# Patient Record
Sex: MALE | Race: WHITE | NOT HISPANIC OR LATINO | Employment: OTHER | URBAN - METROPOLITAN AREA
[De-identification: names, ages, dates, MRNs, and addresses within clinical notes are randomized per-mention and may not be internally consistent; named-entity substitution may affect disease eponyms.]

---

## 2019-09-03 ENCOUNTER — APPOINTMENT (OUTPATIENT)
Dept: LAB | Facility: HOSPITAL | Age: 64
End: 2019-09-03

## 2019-09-03 ENCOUNTER — CLINICAL SUPPORT (OUTPATIENT)
Dept: GENETICS | Facility: HOSPITAL | Age: 64
End: 2019-09-03

## 2019-09-03 DIAGNOSIS — Z13.79 GENETIC TESTING: Primary | ICD-10-CM

## 2019-09-03 DIAGNOSIS — Z13.79 ENCOUNTER FOR GENETIC SCREENING: ICD-10-CM

## 2019-09-03 DIAGNOSIS — Z82.79 FAMILY HISTORY OF MARFAN SYNDROME: Primary | ICD-10-CM

## 2019-09-03 NOTE — PROGRESS NOTES
Daniel Anguiano, a 64-year old male, was referred for genetic counseling due to a family history of Marfan syndrome in his son. His son, Anil, recently tested positive for a pathogenic mutation in the FBN1 gene. His son underwent genetic testing after he had an ascending aortic dissection at age 33.  Mr. Anguiano was interested in discussing genetic testing for the known familial mutation and opted to pursue single site testing of the FBN1 gene.  Single site testing was ordered and results are expected in approximately 2-3 weeks.       FAMILY HISTORY:   Son: Marfan syndrome, FBN1+ (c.[id number])    A copy of Mr. Anguiano’s son’s genetic test results confirming the FBN1 mutation was available for review.     GENETIC COUNSELING: Marfan syndrome is a systemic disorder of connective tissue with a high degree of clinical variability. Cardinal manifestations involve the ocular, skeletal, and cardiovascular systems. FBN1 pathogenic mutations are associated with a broad phenotypic continuum, ranging from isolated features of Marfan syndrome to  presentation of severe and rapidly progressive disease in multiple organ systems. It is inherited in an autosomal dominant manner; therefore, children of an individual who has Marfan syndrome have a 50% risk of inheriting the FBN1 mutation and also having Marfan syndrome. Approximately 25% of individuals have Marfan syndrome as a result of a de jarad pathogenic FBN1 mutation, meaning that it is a new mutation that was not inherited from either parent. We discussed the clinical recommendations of someone who tests positive for a pathogenic mutation in the FBN1 gene.     The risks, benefits, and limitations of genetic testing and implications for clinical management following testing were reviewed. DNA test results can influence decisions regarding screening, prevention, and surgical management. Genetic testing can have significantly psychological implications for both individuals  and families. Also discussed was the possibility of employment and insurance discrimination based on genetic test results and the laws in place to prevent this ([place]), as well as the limitations of these laws. The implications of a positive or negative test result were discussed.       PLAN:  Single site testing for the known FBN1 familial mutation was ordered through [employer]. Results are expected in 2-3 weeks and I will call Mr. Anguiano to discuss results once they are received.       Melina Addison MS, [facility] – [place], [facility]     Licensed Certified Genetic Counselor

## 2019-09-16 ENCOUNTER — DOCUMENTATION (OUTPATIENT)
Dept: GENETICS | Facility: HOSPITAL | Age: 64
End: 2019-09-16

## 2019-09-16 NOTE — PROGRESS NOTES
Daniel Anguiano, a 64-year old male, was referred for genetic counseling due to a family history of Marfan syndrome in his son. His son, Anil, recently tested positive for a pathogenic mutation in the FBN1 gene. His son underwent genetic testing after he had an ascending aortic dissection at age 33.   Mr. Anguiano was interested in discussing genetic testing for the known familial mutation and opted to pursue single site testing of the FBN1 gene.  Genetic testing was negative for the pathogenic mutation in the FBN1 gene that was previously identified in his son, indicating that this genetic risk factor was not inherited from Mr. Anguiano. These normal results were discussed with Mr. Anguiano’s wife, per his request, on 19.     FAMILY HISTORY:   Son: Marfan syndrome, FBN1+ ([id number].[id number])    Of note, we briefly discussed Mr. Anguiano’s family history of cancer, which includes his grandfather’s history of pancreatic cancer. Given this family history, he would meet NCCN criteria for BRCA1/2 testing. We discussed that he could be re-referred to genetic counseling in the future to discuss genetic testing options and family history-based risk assessment if he is interested.      A copy of Mr. Anguiano’s son’s genetic test results confirming the FBN1 mutation was available for review.     GENETIC COUNSELING: Marfan syndrome is a systemic disorder of connective tissue with a high degree of clinical variability. Cardinal manifestations involve the ocular, skeletal, and cardiovascular systems. FBN1 pathogenic mutations are associated with a broad phenotypic continuum, ranging from isolated features of Marfan syndrome to  presentation of severe and rapidly progressive disease in multiple organ systems. It is inherited in an autosomal dominant manner; therefore, children of an individual who has Marfan syndrome have a 50% risk of inheriting the FBN1 mutation and also having Marfan syndrome. Approximately 25% of  individuals have Marfan syndrome as a result of a de jarad pathogenic FBN1 mutation, meaning that it is a new mutation that was not inherited from either parent. We discussed the clinical recommendations of someone who tests positive for a pathogenic mutation in the FBN1 gene.     The risks, benefits, and limitations of genetic testing and implications for clinical management following testing were reviewed. DNA test results can influence decisions regarding screening, prevention, and surgical management. Genetic testing can have significantly psychological implications for both individuals and families. Also discussed was the possibility of employment and insurance discrimination based on genetic test results and the laws in place to prevent this ([place]), as well as the limitations of these laws. The implications of a positive or negative test result were discussed.       TEST RESULTS: Genetic testing was negative for the familial pathogenic FBN1 mutation ([id number].[id number]). This indicates that this pathogenic mutation identified in his son was not inherited from Mr. Anguiano’s side of the family.     PLAN:  Genetic counseling remains available to Mr. Anguiano and his family. He is encouraged to contact us with any questions or concerns at [phone].       Melina Addison MS, [facility] [place] [facility]     Licensed Certified Genetic Counselor      Cc: Daniel Shankar MD

## 2020-03-03 ENCOUNTER — TELEPHONE (OUTPATIENT)
Dept: GASTROENTEROLOGY | Facility: CLINIC | Age: 65
End: 2020-03-03

## 2020-03-03 NOTE — TELEPHONE ENCOUNTER
I CALLED PATIENT BACK. CONFIRM APPOINTMENT FOR 3/30/20 WITH DR GALAVIZ. ALSO I PUT PHARMACY INFORMATION IN CHART.

## 2020-03-13 ENCOUNTER — TELEPHONE (OUTPATIENT)
Dept: GASTROENTEROLOGY | Facility: CLINIC | Age: 65
End: 2020-03-13

## 2020-06-03 DIAGNOSIS — Z12.11 SCREENING FOR COLON CANCER: Primary | ICD-10-CM

## 2020-06-03 RX ORDER — SODIUM, POTASSIUM,MAG SULFATES 17.5-3.13G
1 SOLUTION, RECONSTITUTED, ORAL ORAL TAKE AS DIRECTED
Qty: 2 BOTTLE | Refills: 0 | Status: SHIPPED | OUTPATIENT
Start: 2020-06-03

## 2020-06-05 ENCOUNTER — APPOINTMENT (OUTPATIENT)
Dept: PREADMISSION TESTING | Facility: HOSPITAL | Age: 65
End: 2020-06-05

## 2020-06-05 LAB
REF LAB TEST METHOD: NORMAL
SARS-COV-2 RNA RESP QL NAA+PROBE: NOT DETECTED

## 2020-06-05 PROCEDURE — C9803 HOPD COVID-19 SPEC COLLECT: HCPCS

## 2020-06-05 PROCEDURE — U0004 COV-19 TEST NON-CDC HGH THRU: HCPCS

## 2020-06-05 PROCEDURE — U0002 COVID-19 LAB TEST NON-CDC: HCPCS

## 2020-06-08 ENCOUNTER — OUTSIDE FACILITY SERVICE (OUTPATIENT)
Dept: GASTROENTEROLOGY | Facility: CLINIC | Age: 65
End: 2020-06-08

## 2020-06-08 PROCEDURE — 45385 COLONOSCOPY W/LESION REMOVAL: CPT | Performed by: INTERNAL MEDICINE

## 2020-06-08 PROCEDURE — 88305 TISSUE EXAM BY PATHOLOGIST: CPT | Performed by: INTERNAL MEDICINE

## 2020-06-08 PROCEDURE — 45380 COLONOSCOPY AND BIOPSY: CPT | Performed by: INTERNAL MEDICINE

## 2020-06-09 ENCOUNTER — LAB REQUISITION (OUTPATIENT)
Dept: LAB | Facility: HOSPITAL | Age: 65
End: 2020-06-09

## 2020-06-09 DIAGNOSIS — Z86.010 PERSONAL HISTORY OF COLONIC POLYPS: ICD-10-CM

## 2020-06-10 LAB
CYTO UR: NORMAL
LAB AP CASE REPORT: NORMAL
LAB AP CLINICAL INFORMATION: NORMAL
PATH REPORT.FINAL DX SPEC: NORMAL
PATH REPORT.GROSS SPEC: NORMAL

## 2023-06-30 ENCOUNTER — TELEPHONE (OUTPATIENT)
Dept: GASTROENTEROLOGY | Facility: CLINIC | Age: 68
End: 2023-06-30

## 2023-06-30 NOTE — TELEPHONE ENCOUNTER
Caller: Daniel Anguiano    Relationship to patient: Self    Best call back number: [phone]  CAN CALL ANY TIME    Patient is needing: PATIENT WOULD LIKE TO BE SCHEDULED FOR A ROUTINE COLONOSCOPY WITH DR. HOOD.

## 2023-09-13 ENCOUNTER — TELEPHONE (OUTPATIENT)
Dept: GASTROENTEROLOGY | Facility: CLINIC | Age: 68
End: 2023-09-13

## 2023-09-13 NOTE — TELEPHONE ENCOUNTER
Hub staff attempted to follow warm transfer process and was unsuccessful     Caller: Daniel Anguiano    Relationship to patient: Self    Best call back number: [phone]    Patient is needing: PATIENT CALLED IN AND STATED THAT HE NEEDS TO CANCEL HIS UPCOMING COLONOSCOPY SCHEDULED FOR 09/14/2023 DUE TO A DEATH IN THE FAMILY. PATIENT STATED THAT HE WILL CALL BACK AT ANOTHER TIME TO RESCHEDULE.

## 2023-09-28 ENCOUNTER — TELEPHONE (OUTPATIENT)
Dept: GASTROENTEROLOGY | Facility: CLINIC | Age: 68
End: 2023-09-28
Payer: OTHER GOVERNMENT

## 2023-09-29 PROCEDURE — 88305 TISSUE EXAM BY PATHOLOGIST: CPT

## 2023-10-02 ENCOUNTER — LAB REQUISITION (OUTPATIENT)
Dept: LAB | Facility: HOSPITAL | Age: 68
End: 2023-10-02
Payer: OTHER GOVERNMENT

## 2023-10-02 DIAGNOSIS — D12.3 BENIGN NEOPLASM OF TRANSVERSE COLON: ICD-10-CM

## 2023-10-02 DIAGNOSIS — D12.5 BENIGN NEOPLASM OF SIGMOID COLON: ICD-10-CM

## 2023-10-02 DIAGNOSIS — K64.8 OTHER HEMORRHOIDS: ICD-10-CM

## 2023-10-02 DIAGNOSIS — Z86.010 PERSONAL HISTORY OF COLONIC POLYPS: ICD-10-CM

## 2023-10-02 DIAGNOSIS — D12.0 BENIGN NEOPLASM OF CECUM: ICD-10-CM

## 2023-10-02 DIAGNOSIS — Z80.0 FAMILY HISTORY OF MALIGNANT NEOPLASM OF DIGESTIVE ORGANS: ICD-10-CM

## 2023-10-03 LAB — REF LAB TEST METHOD: NORMAL
